# Patient Record
Sex: FEMALE | Race: WHITE | NOT HISPANIC OR LATINO | Employment: FULL TIME | URBAN - NONMETROPOLITAN AREA
[De-identification: names, ages, dates, MRNs, and addresses within clinical notes are randomized per-mention and may not be internally consistent; named-entity substitution may affect disease eponyms.]

---

## 2021-09-07 ENCOUNTER — OFFICE VISIT (OUTPATIENT)
Dept: OBSTETRICS AND GYNECOLOGY | Facility: CLINIC | Age: 31
End: 2021-09-07

## 2021-09-07 VITALS
SYSTOLIC BLOOD PRESSURE: 104 MMHG | WEIGHT: 220 LBS | DIASTOLIC BLOOD PRESSURE: 72 MMHG | HEIGHT: 66 IN | BODY MASS INDEX: 35.36 KG/M2

## 2021-09-07 DIAGNOSIS — N91.2 AMENORRHEA: ICD-10-CM

## 2021-09-07 DIAGNOSIS — Z01.411 ENCOUNTER FOR GYNECOLOGICAL EXAMINATION WITH ABNORMAL FINDING: Primary | ICD-10-CM

## 2021-09-07 DIAGNOSIS — Z12.4 SCREENING FOR CERVICAL CANCER: ICD-10-CM

## 2021-09-07 PROCEDURE — G0123 SCREEN CERV/VAG THIN LAYER: HCPCS | Performed by: OBSTETRICS & GYNECOLOGY

## 2021-09-07 PROCEDURE — 99385 PREV VISIT NEW AGE 18-39: CPT | Performed by: OBSTETRICS & GYNECOLOGY

## 2021-09-07 PROCEDURE — 87624 HPV HI-RISK TYP POOLED RSLT: CPT | Performed by: OBSTETRICS & GYNECOLOGY

## 2021-09-07 RX ORDER — ATORVASTATIN CALCIUM 10 MG/1
10 TABLET, FILM COATED ORAL DAILY
COMMUNITY
Start: 2021-08-12

## 2021-09-07 RX ORDER — ESCITALOPRAM OXALATE 5 MG/1
5 TABLET ORAL DAILY
COMMUNITY
Start: 2021-08-23

## 2021-09-07 RX ORDER — LEVOTHYROXINE SODIUM 0.05 MG/1
50 TABLET ORAL EVERY MORNING
COMMUNITY
Start: 2021-07-12

## 2021-09-07 RX ORDER — MEDROXYPROGESTERONE ACETATE 10 MG/1
10 TABLET ORAL DAILY
Qty: 10 TABLET | Refills: 0 | Status: SHIPPED | OUTPATIENT
Start: 2021-09-07 | End: 2021-10-08

## 2021-09-07 NOTE — PROGRESS NOTES
"CC: annual exam    SUBJECTIVE: Yumi Cohen is a 31 y.o. female , para 0, who comes to the office today for annual GYN examination. Her last Pap smear was 3 years ago, and was normal. She has no history of cervical dysplasia. She is currently trying to conceive. Her medical history is reviewed. 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 She was on OCPs for several years and had regular periods while on pills.    HPI      Social History     Tobacco Use   • Smoking status: Current Some Day Smoker     Types: Cigarettes   • Smokeless tobacco: Never Used   Substance Use Topics   • Alcohol use: Yes     Comment: occasional    • Drug use: Never      Review of Systems   Constitutional: Negative for fever.   Respiratory: Negative for cough.    Genitourinary: Positive for menstrual problem.   Hematological: Does not bruise/bleed easily.     Visit Vitals  /72 (BP Location: Left arm, Patient Position: Sitting)   Ht 166.4 cm (65.5\")   Wt 99.8 kg (220 lb)   LMP 10/20/2020 (Exact Date)   BMI 36.05 kg/m²      Objective   Physical Exam  Vitals and nursing note reviewed. Exam conducted with a chaperone present.   Constitutional:       General: She is not in acute distress.     Appearance: She is well-developed.   HENT:      Head: Normocephalic and atraumatic.   Cardiovascular:      Rate and Rhythm: Normal rate and regular rhythm.      Heart sounds: No murmur heard.     Pulmonary:      Effort: Pulmonary effort is normal.      Breath sounds: Normal breath sounds.   Chest:      Breasts:         Right: No inverted nipple or mass.         Left: No inverted nipple or mass.   Abdominal:      General: There is no distension.      Palpations: Abdomen is soft.      Tenderness: There is no abdominal tenderness.   Genitourinary:     General: Normal vulva.      Exam position: Lithotomy position.      Labia:         Right: No tenderness or lesion.         Left: No " tenderness or lesion.       Vagina: Normal. No vaginal discharge, tenderness or bleeding.      Cervix: No cervical motion tenderness, discharge or friability.      Uterus: Normal.       Adnexa:         Right: No tenderness or fullness.          Left: No tenderness or fullness.        Comments: A Pap smear was performed  Musculoskeletal:         General: Normal range of motion.      Cervical back: Normal range of motion and neck supple.   Skin:     General: Skin is warm and dry.   Neurological:      Mental Status: She is alert and oriented to person, place, and time.   Psychiatric:         Behavior: Behavior normal.         Judgment: Judgment normal.       Assessment/Plan   Diagnoses and all orders for this visit:    1. Encounter for gynecological examination with abnormal finding (Primary)    2. Amenorrhea  -     medroxyPROGESTERone (Provera) 10 MG tablet; Take 1 tablet by mouth Daily.  Dispense: 10 tablet; Refill: 0    3. Screening for cervical cancer  -     Liquid-based Pap Smear, Screening      We will notify her when the Pap smear results are available. We have discussed current Pap smear screening guidelines.  She will return in 3 weeks to see if she has a withdrawal bleed with Provera. In the meantime if she develops questions or problems, she will notify the office.

## 2021-09-10 LAB
GEN CATEG CVX/VAG CYTO-IMP: ABNORMAL
HPV I/H RISK 4 DNA CVX QL PROBE+SIG AMP: NOT DETECTED
LAB AP CASE REPORT: ABNORMAL
LAB AP GYN ADDITIONAL INFORMATION: ABNORMAL
LAB AP GYN OTHER FINDINGS: ABNORMAL
PATH INTERP SPEC-IMP: ABNORMAL
STAT OF ADQ CVX/VAG CYTO-IMP: ABNORMAL

## 2021-10-08 ENCOUNTER — OFFICE VISIT (OUTPATIENT)
Dept: OBSTETRICS AND GYNECOLOGY | Facility: CLINIC | Age: 31
End: 2021-10-08

## 2021-10-08 VITALS
DIASTOLIC BLOOD PRESSURE: 72 MMHG | WEIGHT: 223 LBS | BODY MASS INDEX: 35.84 KG/M2 | HEIGHT: 66 IN | SYSTOLIC BLOOD PRESSURE: 108 MMHG

## 2021-10-08 DIAGNOSIS — N91.1 SECONDARY AMENORRHEA: Primary | ICD-10-CM

## 2021-10-08 PROCEDURE — 99213 OFFICE O/P EST LOW 20 MIN: CPT | Performed by: OBSTETRICS & GYNECOLOGY

## 2021-10-08 NOTE — PROGRESS NOTES
"Yumi Cohen is a 31 y.o. female here today for follow-up of secondary amenorrhea. 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 She was on OCPs for several years and had regular periods while on pills.  After her visit last month, she took a week of daily Provera, and reports that although she had some cramping she did not have any withdrawal bleeding.    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  We will notify her when the results are available to discuss further care.  In the meantime if she has questions or concerns she will contact the office.      "

## 2021-10-16 LAB
ESTRADIOL SERPL-MCNC: <5 PG/ML
FSH SERPL-ACNC: 117 MIU/ML
LH SERPL-ACNC: 47.9 MIU/ML
MIS SERPL-MCNC: <0.015 NG/ML
PROLACTIN SERPL-MCNC: 9.2 NG/ML (ref 4.8–23.3)
TSH SERPL DL<=0.005 MIU/L-ACNC: 2.25 UIU/ML (ref 0.27–4.2)

## 2025-03-31 ENCOUNTER — OFFICE VISIT (OUTPATIENT)
Age: 35
End: 2025-03-31
Payer: COMMERCIAL

## 2025-03-31 VITALS — BODY MASS INDEX: 35.84 KG/M2 | WEIGHT: 223 LBS | HEIGHT: 66 IN

## 2025-03-31 DIAGNOSIS — Z94.84 HISTORY OF ALLOGENEIC STEM CELL TRANSPLANT: ICD-10-CM

## 2025-03-31 DIAGNOSIS — Z85.6 HISTORY OF ACUTE MYELOID LEUKEMIA: ICD-10-CM

## 2025-03-31 DIAGNOSIS — M87.051 AVASCULAR NECROSIS OF BONE OF HIP, RIGHT: ICD-10-CM

## 2025-03-31 DIAGNOSIS — M25.551 GREATER TROCHANTERIC PAIN SYNDROME OF RIGHT LOWER EXTREMITY: Primary | ICD-10-CM

## 2025-03-31 RX ORDER — LISDEXAMFETAMINE DIMESYLATE 40 MG/1
40 CAPSULE ORAL
COMMUNITY
Start: 2023-12-14 | End: 2025-04-05

## 2025-03-31 RX ORDER — CETIRIZINE HYDROCHLORIDE 10 MG/1
1 TABLET ORAL DAILY
COMMUNITY
Start: 2015-03-26

## 2025-03-31 RX ORDER — LIDOCAINE HYDROCHLORIDE 10 MG/ML
2 INJECTION, SOLUTION INFILTRATION; PERINEURAL ONCE
Status: COMPLETED | OUTPATIENT
Start: 2025-03-31 | End: 2025-03-31

## 2025-03-31 RX ORDER — ESCITALOPRAM OXALATE 10 MG/1
10 TABLET ORAL DAILY
COMMUNITY
Start: 2023-03-26 | End: 2026-01-03

## 2025-03-31 RX ORDER — TRIAMCINOLONE ACETONIDE 40 MG/ML
40 INJECTION, SUSPENSION INTRA-ARTICULAR; INTRAMUSCULAR ONCE
Status: COMPLETED | OUTPATIENT
Start: 2025-03-31 | End: 2025-03-31

## 2025-03-31 RX ORDER — LEVOTHYROXINE SODIUM 75 UG/1
75 TABLET ORAL
COMMUNITY
Start: 2025-01-02 | End: 2025-07-02

## 2025-03-31 RX ADMIN — LIDOCAINE HYDROCHLORIDE 2 ML: 10 INJECTION, SOLUTION INFILTRATION; PERINEURAL at 09:17

## 2025-03-31 RX ADMIN — TRIAMCINOLONE ACETONIDE 40 MG: 40 INJECTION, SUSPENSION INTRA-ARTICULAR; INTRAMUSCULAR at 09:17

## 2025-04-11 ENCOUNTER — TELEPHONE (OUTPATIENT)
Age: 35
End: 2025-04-11
Payer: COMMERCIAL

## 2025-04-11 NOTE — TELEPHONE ENCOUNTER
Caller: Yumi Cohen    Relationship: Self    Best call back number: [phone]    What is the best time to reach you: AFTER 10:30AM    Who are you requesting to speak with (clinical staff, provider,  specific staff member): BRITTANEY    Do you know the name of the person who called: YES    What was the call regarding: PHYSICAL THERAPY    Is it okay if the provider responds through MyChart: NO

## 2025-04-28 ENCOUNTER — OFFICE VISIT (OUTPATIENT)
Age: 35
End: 2025-04-28
Payer: COMMERCIAL

## 2025-04-28 VITALS — WEIGHT: 223 LBS | HEIGHT: 66 IN | BODY MASS INDEX: 35.84 KG/M2

## 2025-04-28 DIAGNOSIS — Z85.6 HISTORY OF ACUTE MYELOID LEUKEMIA: ICD-10-CM

## 2025-04-28 DIAGNOSIS — Z94.84 HISTORY OF ALLOGENEIC STEM CELL TRANSPLANT: ICD-10-CM

## 2025-04-28 DIAGNOSIS — M87.051 AVASCULAR NECROSIS OF BONE OF HIP, RIGHT: ICD-10-CM

## 2025-04-28 DIAGNOSIS — M25.551 GREATER TROCHANTERIC PAIN SYNDROME OF RIGHT LOWER EXTREMITY: Primary | ICD-10-CM

## 2025-04-28 PROCEDURE — 99214 OFFICE O/P EST MOD 30 MIN: CPT | Performed by: STUDENT IN AN ORGANIZED HEALTH CARE EDUCATION/TRAINING PROGRAM

## 2025-04-28 RX ORDER — ALENDRONATE SODIUM 10 MG/1
10 TABLET ORAL
Qty: 90 TABLET | Refills: 3 | Status: SHIPPED | OUTPATIENT
Start: 2025-04-28

## 2025-04-28 NOTE — PROGRESS NOTES
[facility]  Florentin Rangel MD, PhD  Dimitri Rangel PA-C    CHIEF COMPLAINT  Initial Evaluation of the Right Hip (Patient presents today for right hip follow up. Greater trochanter injection given on 3/31/25. Patient states pain improved for 2 weeks but now has pain again. )       HISTORY OF PRESENT ILLNESS    History of Present Illness  The patient is a 35-year-old female who presents for follow-up on hip pain.    She reports that the injection administered during her previous visit provided relief for a duration of 2 weeks. However, somewhat of a resurgence of pain was experienced over the weekend, which has progressively worsened. The pain is localized to the lateral aspect of her hip as before.     She is currently undergoing physical therapy at [facility] which is going well.   She is also here today to follow-up on the avascular necrosis and discuss medication options for this.       HISTORY    Current Outpatient Medications   Medication Instructions    alendronate (FOSAMAX) 10 mg, Oral, Every Morning Before Breakfast    cetirizine (ZyrTEC Allergy) 10 MG tablet 1 tablet, Daily    CYCLOBENZAPRINE 10MG/5ML SUSP     escitalopram (LEXAPRO) 10 mg, Daily    levothyroxine (SYNTHROID, LEVOTHROID) 75 mcg    lisdexamfetamine (VYVANSE) 40 mg         reports that she has been smoking cigarettes. She has never used smokeless tobacco. She reports current alcohol use. She reports that she does not use drugs.    Past Medical History:   Diagnosis Date    Anxiety     Depression     Disease of thyroid gland     Hyperlipidemia     Leukemia         Past Surgical History:   Procedure Laterality Date    ADENOIDECTOMY      PENILE CYST REMOVAL      TONSILLECTOMY          PHYSICAL EXAM  Constitutional: The patient is in no apparent distress and generally well-appearing. The patient hears me clearly and answers questions appropriately.   Musculoskeletal:  Physical Exam  General: The patient  appears well and in no acute distress.    Musculoskeletal:  Right Hip: Tenderness noted over the greater trochanteric region.  Pain with internal/external rotation of the hip.      IMAGING    No results found.     Results  Labs   - Calcium level: 8.84   - Vitamin D level: 43    MRI shows bone infarcts in both sides of the pelvis, femoral head, sacrum, and pelvis. There are signs of early collapse in the femoral head.        ASSESSMENT & PLAN  Diagnoses and all orders for this visit:    1. Greater trochanteric pain syndrome of right lower extremity (Primary)    2. Avascular necrosis of bone of hip, right  -     alendronate (FOSAMAX) 10 MG tablet; Take 1 tablet by mouth Every Morning Before Breakfast.  Dispense: 90 tablet; Refill: 3    3. History of acute myeloid leukemia    4. History of allogeneic stem cell transplant    Patient was 21 years old she underwent chemotherapy and bone marrow transplant as part of her treatment for leukemia.  She has subsequently developed hip pain and on a previous MRI was noted to have avascular necrosis in multiple places in her pelvis.  Most notably on the right side she has signs of early collapse of the femoral head and about 40% of the articular surfaces involved.  She understands that this may result in need for hip replacement surgery at some point in her life.  However we want to do everything we can to avoid this and we talked at length today about bisphosphonate therapy.  After discussion of the risks including esophageal irritation, atypical femoral fractures, and osteonecrosis of the jaw, she would like to proceed with treatment.  She also had a previous DEXA which showed osteopenia and this should help promote her bone density in general.  She had labs done at her PCP office and had normal calcium levels, normal kidney function, and she cannot conceive due to her previous treatments.  I recommended supplementation with calcium and vitamin D as well.  I recommended 5000 IUs  daily of vitamin D3 and a goal of 1200 mg daily of calcium through diet and supplementation only if needed.  We will plan for serial imaging with MRI of the pelvis in the future to monitor for any progression of the avascular necrosis and signs of collapse.  As long as symptoms remain stable I will plan for repeat imaging in about 1 year.  Repeat DEXA scan may also be considered, but this is generally for monitoring bone mineral density response to these medications, and and in her case we are actually treating avascular necrosis and not specifically the low bone mineral density.  If she develops any thigh pain she would need x-rays to monitor for atypical femur fractures, but I discussed with her that this is a rare potential complication of treatment.    Her pain also remains mostly in the lateral hip and I am still suspicious for this being related to greater trochanteric pain syndrome, especially given that she did improve with the previous injection.  She is doing physical therapy but I am hopeful that if the pain is from some of the avascular necrosis lesions that starting the bisphosphonate will improve her pain symptoms as well.      Alendronate 10 mg daily  Continue physical therapy  Follow up: 3 to 6 months    Today I spent at least 30 minutes reviewing prior records and imaging, examining and interviewing  patient, discussing treatment options, coordinating follow-up care, and documenting in the medical  record.      Patient or patient representative verbalized consent for the use of Ambient Listening during the visit with  Florentin Rangel MD for chart documentation. 4/28/2025  17:34 CDT      This document has been signed by Florentin Rangel MD on April 28, 2025 17:22 CDT

## 2025-04-28 NOTE — PATIENT INSTRUCTIONS
For all patients with GTPS, I suggest referral to physical therapy to be taught exercise and activity modification. Exercises usually involve isometric loading of the gluteus medius, gluteus minimus, and quadriceps muscle as well as calf strengthening exercises. By strengthening the abductor muscles and controlling adduction, the greater trochanter no longer acts as a fulcrum for the iliotibial tract and compression on the gluteal muscle entheses is avoided. Activity modifications that can be incorporated into activities of daily living, recreation, and sports to relieve stress on the gluteus medius and gluteus minimus tendons include the following:   -Minimize stair climbing, walking up hills   -Avoid hip adduction across the midline   -Sit with hips positioned higher than knees; avoid crossing legs while sitting  -Stand with equal weightbearing through lower limbs  -Avoid side-lying to reduce compressive tendon load     For patients with GTPS who are in acute discomfort and their rest is disturbed by night pain, I suggest initial treatment with nonsteroidal antiinflammatory drugs (NSAIDs) such as ibuprofen, naproxen, meloxicam, diclofenac (if no contraindications).   A local glucocorticoid injection is a reasonable alternative for the patient who wants a rapid response.     When possible, patients with comorbidities known to be associated with GTPS should receive additional management to address the specific condition. Patients with low back pain should engage in back strengthening exercises; a patient with a 1 cm or greater leg length discrepancy may benefit from a 50 percent correction with the use of an insole or heel lift; patients who are overweight or obese should participate in a weight reduction program. Aerobic conditioning is also generally beneficial for patients.

## 2025-05-30 ENCOUNTER — OFFICE VISIT (OUTPATIENT)
Age: 35
End: 2025-05-30
Payer: COMMERCIAL

## 2025-05-30 ENCOUNTER — HOSPITAL ENCOUNTER (OUTPATIENT)
Dept: GENERAL RADIOLOGY | Facility: HOSPITAL | Age: 35
Discharge: HOME | End: 2025-05-30
Admitting: STUDENT IN AN ORGANIZED HEALTH CARE EDUCATION/TRAINING PROGRAM
Payer: COMMERCIAL

## 2025-05-30 VITALS — BODY MASS INDEX: 35.84 KG/M2 | WEIGHT: 223 LBS | HEIGHT: 66 IN

## 2025-05-30 DIAGNOSIS — M87.051 AVASCULAR NECROSIS OF BONE OF HIP, RIGHT: ICD-10-CM

## 2025-05-30 DIAGNOSIS — Z85.6 HISTORY OF ACUTE MYELOID LEUKEMIA: ICD-10-CM

## 2025-05-30 DIAGNOSIS — M16.51 POST-TRAUMATIC OSTEOARTHRITIS OF RIGHT HIP: ICD-10-CM

## 2025-05-30 DIAGNOSIS — M87.151 OSTEONECROSIS DUE TO DRUGS, RIGHT FEMUR: Primary | ICD-10-CM

## 2025-05-30 DIAGNOSIS — Z94.84 HISTORY OF ALLOGENEIC STEM CELL TRANSPLANT: ICD-10-CM

## 2025-05-30 DIAGNOSIS — M87.9 BONE INFARCT: ICD-10-CM

## 2025-05-30 PROCEDURE — 73502 X-RAY EXAM HIP UNI 2-3 VIEWS: CPT

## 2025-05-30 RX ORDER — CYCLOBENZAPRINE HCL 10 MG
10 TABLET ORAL DAILY PRN
COMMUNITY

## 2025-05-30 RX ORDER — CEFDINIR 300 MG/1
300 CAPSULE ORAL
COMMUNITY
Start: 2025-05-28 | End: 2025-06-05

## 2025-05-30 RX ORDER — PREDNISONE 20 MG/1
40 TABLET ORAL DAILY
COMMUNITY
Start: 2025-05-28 | End: 2025-06-03

## 2025-05-30 NOTE — PROGRESS NOTES
[facility]  Florentin Rangel MD, PhD  Dimitri Rangel PA-C    CHIEF COMPLAINT  Follow-up of the Right Hip (Patient presents to the office today for right hip follow up. X-ray performed on 5/30/2025. Patient states per physcial therapy that she is having decreased ROM. Patient is having increased pain in hip/ lower back. No new injury.)       HISTORY OF PRESENT ILLNESS    History of Present Illness  The patient is a 35-year-old female here to follow up on her right hip.    She reports a progressive loss of range of motion in her right hip, as observed by her physical therapist during each visit. The therapist noted an outward rotation of her foot while walking. She has been experiencing a limp and increased pain localized to the hip and extending into the groin. She describes a sensation of her leg becoming stuck at a certain point during movement. She avoids anti-inflammatories due to her post-transplant status. She has previously taken celecoxib for neck pain and currently uses cyclobenzaprine 10 mg as needed. She is not on any pain medications. She is currently on day 3 of a 5-day course of prednisone for a sinus infection, which has provided minimal relief for her hip pain. She is also taking Fosamax without any reported issues.    She has a history of acute myeloid leukemia.       HISTORY    Current Outpatient Medications   Medication Instructions    alendronate (FOSAMAX) 10 mg, Oral, Every Morning Before Breakfast    cefdinir (OMNICEF) 300 mg    cetirizine (ZyrTEC Allergy) 10 MG tablet 1 tablet, Daily    cyclobenzaprine (FLEXERIL) 10 mg, Oral, Daily PRN    CYCLOBENZAPRINE 10MG/5ML SUSP     escitalopram (LEXAPRO) 10 mg, Daily    levothyroxine (SYNTHROID, LEVOTHROID) 75 mcg    lisdexamfetamine (VYVANSE) 40 mg    predniSONE (DELTASONE) 40 mg, Daily         reports that she quit smoking about 2 years ago. Her smoking use included cigarettes. She started smoking about 17  years ago. She has never used smokeless tobacco. She reports current alcohol use. She reports that she does not use drugs.    Past Medical History:   Diagnosis Date    Anxiety     Depression     Disease of thyroid gland     Hyperlipidemia     Leukemia         Past Surgical History:   Procedure Laterality Date    ADENOIDECTOMY      PENILE CYST REMOVAL      TONSILLECTOMY          PHYSICAL EXAM  Constitutional: The patient is in no apparent distress and generally well-appearing. The patient hears me clearly and answers questions appropriately.   Musculoskeletal:  Physical Exam  Musculoskeletal:  Gait: Antalgic with externally rotated hip  Right Hip: Decreased range of motion with internal and external rotation.  Pain with logrolling.  Positive HAMLET and FADIR.  Nontender greater trochanter, SI joint, hip flexors      IMAGING    XR Hip With or Without Pelvis 2 - 3 View Right  Result Date: 5/30/2025  Narrative: EXAMINATION: XR HIP W OR WO PELVIS 2-3 VIEW RIGHT- 5/30/2025 1:46 PM  HISTORY: Right hip pain; M87.051-Idiopathic aseptic necrosis of right femur.  REPORT: An AP view of the pelvis, 2 separate views of the right hip were obtained.  COMPARISON: Outside facility MRI of the pelvis [facility] 3/7/2025.  Alignment of the hips is normal, no acute fracture is identified. The hip joint spaces are mildly narrowed bilaterally, there is sclerosis of the articular surface of the acetabulum bilaterally, with mild spurring greater on the right. The sclerotic changes extend about 2 cm into the acetabulum, with cystic change which is better demonstrated on previous MRI. On MRI, these are visualized well-defined areas of possible osteonecrosis and there is no collapse of either femoral head. There is mild spurring of the lateral femoral head on the right. There is subtle increased density within the trochanter of each hip greater on the right, which corresponds to additional sites of possible bone infarct seen on  MRI. No acute fracture is identified. The soft tissues are within normal limits.      Impression: 1. Mixed foci of sclerotic and lytic change within both the right and left acetabulum and trochanters greater on the right, corresponding with serpiginous areas of mixed signal on outside MRI in a pattern typical of osteonecrosis. No acute fracture, no AVN of either femoral head.  This report was signed and finalized on 5/30/2025 1:52 PM by Dr. Black Lo MD.         Results  X-rays above personally reviewed and interpreted.   No obvious acute changes of the right hip joint.  Redemonstration of known degenerative changes with areas of osteonecrosis.  No obvious flattening or collapse of the femoral head.       ASSESSMENT & PLAN  Diagnoses and all orders for this visit:    1. 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 History of acute myeloid leukemia    3. History of allogeneic stem cell transplant    4. Bone infarct    5. Post-traumatic osteoarthritis of right hip       Previous MRI shows multifocal bone infarcts throughout the pelvis, sacrum, and proximal femurs.  The MRI noted right femoral head osteonecrosis involving approximately 40% of the articular surface with articular surface flattening/early collapse, moderate secondary right hip osteoarthritis and reactive small right hip joint effusion.  She had been working with physical therapy but had an acute worsening of her pain so I had her follow-up today for repeat x-ray.  I do not see any evidence of further collapse of the femoral head on x-ray, although we discussed that the x-rays may underrepresent those changes.  Nonetheless she has known bone infarcts and osteonecrosis that is becoming increasingly symptomatic and I feel needs further care by a hip preservation specialist.  We have previously discussed that these bone changes are  secondary to the treatment she received for her AML nearly 15 years ago.  I have started her on Fosamax 10 mg daily in effort to help prevent further articular surface collapse and may be delay need for hip replacement surgery.  However we discussed today that total hip arthroplasty may be the best option for her, but I would certainly defer to the preservation specialist regarding this.  She received care for her AML in [place] and would prefer to go back there for subspecialty care so I will make a referral to Dr. Mahan.    Referral to Dr. Mahan  OK to stop PT  OTC analgesics as needed  Follow up: PRN        Patient or patient representative verbalized consent for the use of Ambient Listening during the visit with  Florentin Rangel MD for chart documentation. 5/30/2025  16:37 CDT      This document has been signed by Florentin Rangel MD on May 30, 2025 16:32 CDT

## 2025-06-18 NOTE — PROGRESS NOTES
[facility]  Florentin Rangel MD, PhD  Dimitri Ranegl PA-C    CHIEF COMPLAINT  Initial Evaluation of the Right Hip (Patient presents today for right hip pain. CT performed at [facility] on 02/18/2025. MRI performed at [facility] on 03/07/2025. Patient states pain is intermittent, but has gotten worse over the last several months. Patient has been diagnosed with scoliosis around 10 years old.)       HISTORY OF PRESENT ILLNESS    History of Present Illness  The patient is a 35-year-old female who presents as a new patient for right hip pain.    She has been experiencing persistent right hip pain, which she attributes to her scoliosis and chemotherapy treatment. The pain has intensified over the past 3 to 4 months. She reports uneven hips due to her scoliosis but no other hip-related issues. The majority of her pain is localized to the right hip, with occasional radiation to the groin. She describes a popping sensation in her hip during ambulation, followed by shooting pain extending to her midsection. Occasionally, she experiences a catching sensation in her hip, resulting in a temporary limp. She has not undergone dry needling. She has been using a heating pad nightly for pain relief. She has previously undergone physical therapy for her neck, back, and hips following her chemotherapy treatment.    She was diagnosed with leukemia in 2011, which necessitated a bone marrow transplant, chemotherapy.  She was treated in [place].    She has been having issues with her neck as well that she has been seen for with degenerative disk disease.    A DEXA scan conducted in 2018 revealed osteopenia.    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        HISTORY    Current Outpatient Medications   Medication Instructions    cetirizine (ZyrTEC Allergy) 10 MG tablet 1 tablet, Daily    CYCLOBENZAPRINE 10MG/5ML SUSP     escitalopram (LEXAPRO) 10 mg,  Daily    levothyroxine (SYNTHROID, LEVOTHROID) 75 mcg    lisdexamfetamine (VYVANSE) 40 mg         reports that she has been smoking cigarettes. She has never used smokeless tobacco. She reports current alcohol use. She reports that she does not use drugs.    Past Medical History:   Diagnosis Date    Anxiety     Depression     Disease of thyroid gland     Hyperlipidemia     Leukemia         Past Surgical History:   Procedure Laterality Date    ADENOIDECTOMY      TONSILLECTOMY          PHYSICAL EXAM  Constitutional: The patient is in no apparent distress and generally well-appearing. The patient hears me clearly and answers questions appropriately.   Musculoskeletal:  Right HIP   ---Inspection: normal gait, patient does not appear to be in visible distress, sitting normally, no obvious wasting of muscles   ---Palpation:   TENDER: Trochanter, gluteus medius  Unless otherwise noted, patient is NON-tender at IT band/TFL, ASIS (sartorius), iliac crest, lumbar paraspinal muscles, midline lumbar spine   ---ROM: passive hip flexion to ~120 degrees, symmetric abduction/adduction and internal/external rotation   ---Strength: 5/5 seated hip flexion, 5/5 hip abduction, 5/5 hip adduction.   ---Special tests   Negative log roll test   Negative FADIR   Negative HAMLET       Physical Exam        IMAGING    MRI Outside Pelvis  Result Date: 3/27/2025  Narrative: This procedure was auto-finalized with no dictation required.    RADIOLOGY  Result Date: 3/7/2025  Narrative: Ordered by an unspecified provider.   MRI Outside Pelvis (03/07/2025 00:00)     Results  Imaging  MRI shows bone infarcts in both sides of the pelvis, femoral head, sacrum, and pelvis. There are signs of early collapse in the femoral head.       ASSESSMENT & PLAN  Diagnoses and all orders for this visit:    1. Greater trochanteric pain syndrome of right lower extremity (Primary)  -     lidocaine (XYLOCAINE) 1 % injection 2 mL  -     triamcinolone acetonide (KENALOG-40)  injection 40 mg  -     Ambulatory Referral to Physical Therapy for Evaluation & Treatment    2. Avascular necrosis of bone of hip, right  -     Ambulatory Referral to Physical Therapy for Evaluation & Treatment    3. History of acute myeloid leukemia  -     Ambulatory Referral to Physical Therapy for Evaluation & Treatment    4. History of allogeneic stem cell transplant  -     Ambulatory Referral to Physical Therapy for Evaluation & Treatment    Patient was diagnosed with leukemia when she was 21 years old and underwent chemotherapy and bone marrow transplant.  This is likely what has triggered the avascular necrosis seen throughout her pelvis.  She has multiple areas of bone infarct.  On the right side she has some signs of early collapse of the femoral head and about 40% of the articular surfaces involved, but there is no obvious collapse of the femoral head.  We discussed this at length and how treatment often ends in a hip replacement surgery, although a core decompression could be considered.  Neither of these are anything that I can do for her but I would have be happy to get her referred out to a hip preservation specialist, likely someone in [place] per patient request if she wanted to evaluate that route.  However a lot of her symptoms are actually more in the lateral and posterior hip and on examination she does have tenderness at the greater trochanter.  She does have an area of bone infarct around the trochanter, but her symptoms could simply be greater trochanteric pain syndrome unrelated to the bone infarcts.  We elected to proceed with an injection of the trochanteric bursa to see if it would provide her some pain relief.      We also discussed other potential treatments for avascular necrosis of the hip including bisphosphonates.  She does have a diagnosis of osteopenia and this may be something that could help promote her bone density and treat the avascular necrosis.     Assessment & Plan  1.  Right hip pain.  The MRI results indicate the presence of bone infarcts in the pelvis, femoral head, sacrum, and pelvis, which could potentially lead to a collapse of the femoral head. However, at this juncture, a hip replacement is not deemed necessary. The pain may be attributed to bursitis or tendinopathy, particularly if there are concurrent issues with the lumbar spine. The possibility of core decompression surgery was discussed, but it was clarified that this procedure is not performed in our clinic. The potential benefits of bisphosphonates were also discussed. A referral to a hip preservation specialist will be provided. A steroid injection into the bursa will be administered today. A referral for physical therapy will be provided, with a recommendation to consider dry needling.    Osteopenia.  The patient has a history of osteopenia based on a DEXA scan from 2018. The importance of maintaining bone density through regular exercise and possibly considering bisphosphonates was discussed. The patient was advised to follow up with her primary care provider for ongoing management of bone health.    Scoliosis.  The patient has a history of scoliosis, which may contribute to her hip pain and uneven hips. Continued monitoring and physical therapy were recommended to manage symptoms and prevent further complications.      Greater trochanter injection today  Follow up:     Trochanteric Bursa Injection Procedure Note    Right trochanteric bursa/gluteal tendon insertion injection was discussed with the patient in detail, including indication, risks, benefits, and alternatives. Risks include but are not limited to: incomplete symptom resolution, injection site pain, local irritation, bleeding, infection, allergic reaction, elevated blood pressure and blood sugar. Verbal consent was given for the procedure.  Injection site was identified by physical examination and cleaned with Chloraprep and alcohol swabs. Prior to  "needle insertion, ethyl chloride spray was used for surface anesthesia.  A 22-gauge, 1.5\" needle was directed to the bursa space by lateral approach. Injectate was passed without difficulty. The needle was removed and a simple bandage was applied. The procedure was tolerated well without difficulty.    Injection mixture:  1% plain lidocaine: 2 mL  40 mg/mL triamcinolone acetonide: 1 mL      Patient or patient representative verbalized consent for the use of Ambient Listening during the visit with  Florentin Rangel MD for chart documentation. 4/2/2025  15:49 CDT    Florentin Rangel MD, PhD  " Soolantra Pregnancy And Lactation Text: This medication is Pregnancy Category C. This medication is considered safe during breast feeding.

## 2025-07-30 ENCOUNTER — OFFICE VISIT (OUTPATIENT)
Age: 35
End: 2025-07-30
Payer: COMMERCIAL

## 2025-07-30 VITALS — BODY MASS INDEX: 35.84 KG/M2 | HEIGHT: 66 IN | WEIGHT: 223 LBS

## 2025-07-30 DIAGNOSIS — M16.51 POST-TRAUMATIC OSTEOARTHRITIS OF RIGHT HIP: ICD-10-CM

## 2025-07-30 DIAGNOSIS — M87.151 OSTEONECROSIS DUE TO DRUGS, RIGHT FEMUR: ICD-10-CM

## 2025-07-30 DIAGNOSIS — M87.051 AVASCULAR NECROSIS OF BONE OF HIP, RIGHT: Primary | ICD-10-CM

## 2025-07-30 RX ORDER — PANTOPRAZOLE SODIUM 40 MG/1
40 TABLET, DELAYED RELEASE ORAL DAILY
COMMUNITY
Start: 2025-07-16 | End: 2025-08-16

## 2025-07-30 NOTE — PROGRESS NOTES
[facility]  Florentin Rangel MD, PhD  Dimitri Rangel PA-C    CHIEF COMPLAINT  Follow-up of the Right Hip (Patient presents today for right hip follow up. Referral placed at last visit for [facility] chacon on 10/13/25. PT order given as well on 5/30/25 visit. Patient is currently out of visits for PT and was doing well with pain in physical therapy. Patient is now having increased pain in the lower back and hips without therapy. )       HISTORY OF PRESENT ILLNESS    History of Present Illness  The patient is a 35-year-old female here to follow up on her hip and back pain.    She reports an increase in pain, particularly when lying down or attempting to sleep, which extends from her lower back to her hips.  No new fall or injury.  She got good results with physical therapy but has run out of visits with her insurance.  She is still trying to keep up with some of it on her own.  She has been using Celebrex or Aleve, has tried muscle relaxers, has tried topical analgesics such as Biofreeze, and has a TENS unit at home.  She continues to take Fosamax without any issues.    He appointment with the hip specialist in [place] is scheduled for 10/13/2025. The patient has a history of leukemia diagnosed at age 21, for which she underwent a stem cell transplant. This has led to osteonecrosis in her pelvis and hip joint, contributing to her current symptoms.    SOCIAL HISTORY  Occupations: Respiratory therapist       HISTORY    Current Outpatient Medications   Medication Instructions    alendronate (FOSAMAX) 10 mg, Oral, Every Morning Before Breakfast    cetirizine (ZyrTEC Allergy) 10 MG tablet 1 tablet, Daily    cyclobenzaprine (FLEXERIL) 10 mg, Daily PRN    escitalopram (LEXAPRO) 10 mg, Daily    Ibuprofen 3 %, Gabapentin 10 %, Baclofen 2 %, lidocaine 4 %, Ketamine HCl 4 % 1-2 g, Topical, 3 to 4 Times Daily    levothyroxine (SYNTHROID, LEVOTHROID) 75 mcg    lisdexamfetamine (VYVANSE)  40 mg    pantoprazole (PROTONIX) 40 mg, Daily         reports that she quit smoking about 3 years ago. Her smoking use included cigarettes. She started smoking about 17 years ago. She has never used smokeless tobacco. She reports current alcohol use. She reports that she does not use drugs.    Past Medical History:   Diagnosis Date    Acute myeloid leukemia     Anxiety     AVN (avascular necrosis of bone)     Bilateral HIP    Depression     Disease of thyroid gland     Hx of allogeneic stem cell transplant     Hyperlipidemia     Leukemia         Past Surgical History:   Procedure Laterality Date    ADENOIDECTOMY      PENILE CYST REMOVAL      TONSILLECTOMY          PHYSICAL EXAM  Constitutional: The patient is in no apparent distress and generally well-appearing. The patient hears me clearly and answers questions appropriately.   Musculoskeletal:  Physical Exam  Musculoskeletal:  Bilateral hips:  Normal gait.  Pain with internal/external rotation of the hip.  Some tenderness over the posterolateral hip.      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     REPORT: An AP view of the pelvis, 2 separate views of the right hip were  obtained.     COMPARISON: Outside facility MRI of the pelvis [facility]  3/7/2025.     Alignment of the hips is normal, no acute fracture is identified. The  hip joint spaces are mildly narrowed bilaterally, there is sclerosis of  the articular surface of the acetabulum bilaterally, with mild spurring  greater on the right. The sclerotic changes extend about 2 cm into the  acetabulum, with cystic change which is better demonstrated on previous  MRI. On MRI, these are visualized well-defined areas of possible  osteonecrosis and there is no collapse of either femoral head. There is  mild spurring of the lateral femoral head on the right. There  is subtle  increased density within the trochanter of each hip greater on the  right, which corresponds to additional sites of possible bone infarct  seen on MRI. No acute fracture is identified. The soft tissues are  within normal limits.     IMPRESSION:  1. Mixed foci of sclerotic and lytic change within both the right and  left acetabulum and trochanters greater on the right, corresponding with  serpiginous areas of mixed signal on outside MRI in a pattern typical of  osteonecrosis. No acute fracture, no AVN of either femoral head.     This report was signed and finalized on 5/30/2025 1:52 PM by Dr. Black Lo MD.     No results found.     Results         ASSESSMENT & PLAN  Diagnoses and all orders for this visit:    1. Avascular necrosis of bone of hip, right (Primary)  -     Ibuprofen 3 %, Gabapentin 10 %, Baclofen 2 %, lidocaine 4 %, Ketamine HCl 4 %; Apply 1-2 g topically to the appropriate area as directed 3 (Three) to 4 (Four) times daily.  Dispense: 90 g; Refill: 1    2. 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  Dispense: 90 g; Refill: 1    3. Post-traumatic osteoarthritis of right hip  -     Ibuprofen 3 %, Gabapentin 10 %, Baclofen 2 %, lidocaine 4 %, Ketamine HCl 4 %; Apply 1-2 g topically to the appropriate area as directed 3 (Three) to 4 (Four) times daily.  Dispense: 90 g; Refill: 1    Patient continues to experience pain in her hips and across her low back.  She has done physical therapy and I previously referred her to a hip preservation specialist in [place] but her appointment is not until October.  She ran out of visits with physical therapy although she had been doing well with that.  She is using NSAIDs as needed.  I am going to add on a topical pain med for her to try although we discussed that it can be  sometimes difficult to get it to penetrate deep enough to alleviate the pain.  She is not interested in narcotic type pain medications.  She has already tried a TENS unit.  I discussed with her that they are short of running out of options as far as pain control.  She is still able to work and ambulate but has pain especially if she is laying down at night.  I think the neck step is for her to see the hip preservation specialist as planned to discuss other measures.  It does seem like a lot of her pain is coming from her hip joint, although some of it may be stemming from her low back as well.      Follow-up with hip preservation specialist in [place] as planned  Prescription for compounded topical pain med sent in  Follow up: As needed        Patient or patient representative verbalized consent for the use of Ambient Listening during the visit with  Florentin Rangel MD for chart documentation. 7/30/2025  12:27 CDT      This document has been signed by Florentin Rangel MD on July 30, 2025 10:59 CDT